# Patient Record
Sex: MALE | Race: WHITE
[De-identification: names, ages, dates, MRNs, and addresses within clinical notes are randomized per-mention and may not be internally consistent; named-entity substitution may affect disease eponyms.]

---

## 2018-11-02 ENCOUNTER — HOSPITAL ENCOUNTER (EMERGENCY)
Dept: HOSPITAL 42 - ED | Age: 53
Discharge: HOME | End: 2018-11-02
Payer: COMMERCIAL

## 2018-11-02 VITALS — DIASTOLIC BLOOD PRESSURE: 70 MMHG | SYSTOLIC BLOOD PRESSURE: 123 MMHG | HEART RATE: 73 BPM

## 2018-11-02 VITALS — OXYGEN SATURATION: 98 % | TEMPERATURE: 98.5 F

## 2018-11-02 VITALS — BODY MASS INDEX: 30.1 KG/M2

## 2018-11-02 VITALS — RESPIRATION RATE: 20 BRPM

## 2018-11-02 DIAGNOSIS — F17.210: ICD-10-CM

## 2018-11-02 DIAGNOSIS — M75.32: Primary | ICD-10-CM

## 2018-11-02 NOTE — RAD
Date of service: 



11/02/2018



PROCEDURE:  Radiographs of the Left Shoulder



HISTORY:

pain







COMPARISON:

No prior.



FINDINGS:



BONES:

Normal. No fracture.



JOINTS:

Normal. Glenohumeral and acromioclavicular joints preserved. No 

osteoarthritis.



SOFT TISSUES:

There is a large calcification at the insertion of the rotator cuff 

on the humeral tuberosity.  This measures 34 mm anterior to posterior 

by 7 mm height by 18 mm wide.



OTHER FINDINGS:

None. 



IMPRESSION:

Calcific tendinitis

## 2018-11-02 NOTE — ED PDOC
Arrival/HPI





- General


Chief Complaint: Upper Extremity Problem/Injury


Time Seen by Provider: 11/02/18 03:39


Historian: Patient,  ([employer]  ID#[id number])





- History of Present Illness


Narrative History of Present Illness (Text): 





11/02/18 03:52


53 year old male, with no significant past medical history, presents to the 

emergency department with left shoulder pain, for 2 days.  Patient encounter was

translated by [employer] system.  Patient states he was at work when he things he 

pulled it.  Patient states he didn't feel much pain in the instance, but felt it

the next day.  Patient states the pain makes it very difficult for him to sleep.

 Patient denies any fevers, chills, headache, dizziness, chest pain, shortness 

of breath, cough, abdominal pain, nausea, vomiting, diarrhea, back pain, neck 

pain, urinary/bowel changes, or any other complaint.


 


Time/Duration: < week (2 days)


Symptom Onset: Gradual


Symptom Course: Unchanged


Quality: Aching


Context: Work





Past Medical History





- Provider Review


Nursing Documentation Reviewed: Yes





- Past Medical History


Past Medical History: No Previous





- Cardiac


Hx Pacemaker: No





- Pulmonary


Hx Respiratory Disorders: No





- Neurological


Hx Paralysis: No





- HEENT


Hx HEENT Disorder: No





- Renal


Hx Renal Disorder: No





- Endocrine/Metabolic


Hx Endocrine Disorders: No





- Hematological/Oncological


Hx Blood Transfusions: No





- Integumentary


Hx Dermatological Disorder: No





- Musculoskeletal/Rheumatological


Hx Musculoskeletal Disorders: No





- Gastrointestinal


Hx Gastrointestinal Disorders: No





- Genitourinary/Gynecological


Hx Genitourinary Disorders: No





- Psychiatric


Hx Emotional Abuse: No


Hx Physical Abuse: No


Hx Substance Use: Yes





- Surgical History


Other/Comment: ABD/BACK SX





- Anesthesia


Hx Anesthesia Reactions: No


Hx Malignant Hyperthermia: No





- Suicidal Assessment


Feels Threatened In Home Enviroment: No





Family/Social History





- Physician Review


Nursing Documentation Reviewed: Yes


Family/Social History: No Known Family HX


Smoking Status: Heavy Smoker > 10 Cigarettes Daily


Hx Alcohol Use: Yes (SOCIALLY ON WEEKENDS)


Hx Substance Use: Yes


Hx Substance Use Treatment: No





Allergies/Home Meds


Allergies/Adverse Reactions: 


Allergies





No Known Allergies Allergy (Verified 11/18/14 19:20)


   








Home Medications: 


                                    Home Meds











 Medication  Instructions  Recorded  Confirmed


 


Tramadol HCl [Ultram] 50 mg PO TID PRN 11/02/18 11/02/18


 


oxyCODONE/Acetaminophen [Percocet 1 tab PO DAILY PRN 11/02/18 11/02/18





5/325 mg Tab]   














Review of Systems





- Physician Review


All systems were reviewed & negative as marked: Yes





- Review of Systems


Constitutional: absent: Fevers, Night Sweats


Respiratory: absent: SOB, Cough


Cardiovascular: absent: Chest Pain


Gastrointestinal: absent: Abdominal Pain, Diarrhea, Nausea, Vomiting


Musculoskeletal: Arthralgias (left shoulder).  absent: Back Pain, Neck Pain


Neurological: absent: Headache, Dizziness





Physical Exam





- Physical Exam


Narrative Physical Exam (Text): 





11/02/18 03:56


Gen: VS reviewed, alert, well developed, well nourished, nontoxic, mild 

distress.


Neck: no JVD, supple, no adenopathy.


CV: regular rate, regular rhythm, no rubs, no murmur, no gallops, S1, S2, pulses

equal and strong.


Pulm: no distress, clear to auscultation, no wheeze, no rhonchi, breath sounds 

equal, no rales.


Ext: LUE: Significantly limited ROM in shoulder including abduction flexion and 

internal rotation, positive impingement, no bone tenderness. no edema.


Skin: good color, no rash, no cyanosis.


Psych: responds appropriately to questions, normal affect.


Neuro: oriented x 3, CN2-12 intact grossly, motor intact, sensation intact.





11/02/18 04:18





Vital Signs Reviewed: Yes





Vital Signs











  Temp Pulse Resp BP Pulse Ox


 


 11/02/18 03:33  98.5 F  78  18  160/101 H  98











Temperature: Afebrile


Blood Pressure: Hypertensive


Pulse: Regular


Respiratory Rate: Normal


Appearance: Positive for: Well-Appearing, Non-Toxic, Comfortable


Pain Distress: None


Mental Status: Positive for: Alert and Oriented X 3





Medical Decision Making


ED Course and Treatment: 





11/02/18 04:02


Impression: 53 year old male presents with left shoulder pain.





Plan:


-- X-ray left shoulder


-- Percocet


-- Sling


-- Reassess and disposition





Prior Visits:


Notes and results from previous visits were reviewed. 





Progress Notes:











- RAD Interpretation


Narrative RAD Interpretations (Text): 





11/02/18 04:19


xr shoulder my read: no fx, calcific tendonitis, no dislocation


: ED Physician





- Scribe Statement


The provider has reviewed the documentation as recorded by the Juan Luis Gonzalez





Provider Scribe Attestation:


All medical record entries made by the Scribe were at my direction and 

personally dictated by me. I have reviewed the chart and agree that the record 

accurately reflects my personal performance of the history, physical exam, 

medical decision making, and the department course for this patient. I have also

personally directed, reviewed, and agree with the discharge instructions and 

disposition.











Disposition/Present on Arrival





- Present on Arrival


Any Indicators Present on Arrival: No


History of DVT/PE: No


History of Uncontrolled Diabetes: No


Urinary Catheter: No


History of Decub. Ulcer: No


History Surgical Site Infection Following: None





- Disposition


Have Diagnosis and Disposition been Completed?: Yes


Diagnosis: 


 Calcific tendinitis of left shoulder





Disposition: HOME/ ROUTINE


Disposition Time: 04:20


Patient Plan: Discharge


Condition: STABLE


Discharge Instructions (ExitCare):  Calcific Tendonitis of the Shoulder (DC)


Print Language: Estonian


Prescriptions: 


Ibuprofen [Motrin Tab] 600 mg PO QID #42 tab


oxyCODONE/Acetaminophen [Percocet 5/325 mg Tab] 1 ea PO QID 3 Days #12 tab


Referrals: 


Caridad Scott MD [Primary Care Provider] - Follow up with primary


Felicia Caro MD [Staff Provider] - Follow up with primary


Forms:  [employer] (English), WORK NOTE